# Patient Record
Sex: MALE | Race: WHITE
[De-identification: names, ages, dates, MRNs, and addresses within clinical notes are randomized per-mention and may not be internally consistent; named-entity substitution may affect disease eponyms.]

---

## 2018-01-08 ENCOUNTER — HOSPITAL ENCOUNTER (INPATIENT)
Dept: HOSPITAL 74 - YASAS | Age: 43
LOS: 14 days | Discharge: HOME | DRG: 772 | End: 2018-01-22
Attending: PSYCHIATRY & NEUROLOGY | Admitting: PSYCHIATRY & NEUROLOGY
Payer: COMMERCIAL

## 2018-01-08 VITALS — BODY MASS INDEX: 22.9 KG/M2

## 2018-01-08 DIAGNOSIS — F17.210: ICD-10-CM

## 2018-01-08 DIAGNOSIS — F11.20: Primary | ICD-10-CM

## 2018-01-08 DIAGNOSIS — F14.20: ICD-10-CM

## 2018-01-08 PROCEDURE — HZ42ZZZ GROUP COUNSELING FOR SUBSTANCE ABUSE TREATMENT, COGNITIVE-BEHAVIORAL: ICD-10-PCS | Performed by: PSYCHIATRY & NEUROLOGY

## 2018-01-08 RX ADMIN — NICOTINE SCH: 14 PATCH, EXTENDED RELEASE TRANSDERMAL at 21:29

## 2018-01-08 RX ADMIN — Medication SCH MG: at 21:29

## 2018-01-08 NOTE — HP
Admission [facility]


Chief Complaint: 





REHAB TX FOR DRUG DEPENDENCE


Allergies/Adverse Reactions: 


 Allergies











Allergy/AdvReac Type Severity Reaction Status Date / Time


 


No Known Allergies Allergy   Unverified 13 12:26











History of Present Illness: 





41 Y/O  MALE WITH A HX OF COCAINE AND HEROIN DEPENDENCE ON MMTP 

SEEKING REHAB TX. PT HAS PREVIOUS TX EPISODES.


Exam Limitations: No Limitations





- Ebola screening


Have you traveled outside of the country in the last 21 days: No


Have you had contact with anyone from an Ebola affected area: No


Have you been sick,other than usual withdrawal symptoms: No


Do you have a fever: No





- Review of Systems


Constitutional: Chills, Night Sweats


EENT: reports: Blurred Vision (WEARS GLASSES), Nose Congestion, Dental Problems 

(MISSING TEETH)


Respiratory: reports: No Symptoms reported


Cardiac: reports: No Symptoms Reported


GI: reports: No Symptoms Reported


: reports: No Symptoms Reported


Musculoskeletal: reports: No Symptoms Reported


Integumentary: reports: Bruising (HEALED IVD  INJ SITES ON BOTH ARMS)


Neuro: reports: No Symptoms reported


Endocrine: reports: No Symptoms Reported


Hematology: reports: No Symptoms Reported


Psychiatric: reports: Orientated x3, Anxious


Other Systems: Reviewed and Negative





Patient History





- Patient Medical History


Hx Anemia: No


Hx Asthma: No


Hx Chronic Obstructive Pulmonary Disease (COPD): No


Hx Cardiac Disorders: No


Hx Hypertension: No


Hx Hypercholesterolemia: No


HX Cerebrovascular Accident: No


Hx Seizures: No


Hx Diabetes: No


Hx Gastrointestinal Disorders: No


Hx Genitourinary Disorders: No


Hx Sexually Transmitted Disorders: No


Hx Thyroid Disease: No


Hx Human Immunodeficiency Virus (HIV): No (NEGATIVE HX)


Hx Hepatitis C: No


Hx Depression: No


Hx Suicide Attempt: No (DENIES)


Hx Bipolar Disorder: No


Hx Schizophrenia: No





- Patient Surgical History


Past Surgical History: No


Hx Neurologic Surgery: No


Hx Cataract Extraction: No


Hx Cardiac Surgery: No


Hx Lung Surgery: No


Hx Breast Surgery: No


Hx Breast Biopsy: No


Hx Abdominal Surgery: No


Hx Appendectomy: No


Hx Cholecystectomy: No


Hx Genitourinary Surgery: No


Hx Orthopedic Surgery: No


Anesthesia Reaction: No





- PPD History


Previous Implant?: Yes


Documented Results: Negative w/o proof


PPD to be Administered?: Yes





- Reproductive History


Patient is a Female of Child Bearing Age (11 -55 yrs old): No (MALE)





- Smoking Cessation


Smoking history: Current every day smoker


Have you smoked in the past 12 months: Yes


Aproximately how many cigarettes per day: 10


Hx Chewing Tobacco Use: No


Initiated information on smoking cessation: Yes


'Breaking Loose' booklet given: 18





- Substance & Tx. History


Hx Substance Use: Yes (COCAINE/HEROIN)


Substance Use Type: Cocaine, Heroin


Hx Substance Use Treatment: Yes (CURRENTLY IN [facility])





- Substances Abused


  ** Cocaine


Route: Inhalation


Frequency: 1-3 times last 30 days


Amount used: $20- 40


Age of first use: 26


Date of Last Use: 17





  ** Heroin


Route: Injection (AND SNIFF)


Frequency: 1-2 times per week


Amount used: 2-5 BAGS


Age of first use: 26


Date of Last Use: 19





Family Disease History





- Family Disease History


Family Disease History: CA: Grandparent (), Father (), Mother (

BREAST-ALIVE)





Admission Physical Exam [facility]





- Vital Signs


Vital Signs: 


 Vital Signs - 24 hr











  18





  12:19


 


Temperature 98.2 F


 


Pulse Rate 72


 


Respiratory 18





Rate 


 


Blood Pressure 128/75














- Physical


General Appearance: Yes: No Apparent Distress, Irritable, Anxious


HEENTM: Yes: EOMI, Normocephalic, JENNY, Pharynx Normal, Nasal Congestion, 

Rhinorrhea


Respiratory: Yes: Chest Non-Tender, Lungs Clear, Normal Breath Sounds, No 

Respiratory Distress


Neck: Yes: No masses,lesions,Nodules, Supple, Trachea in good position


Breast: Yes: Breast Exam Deferred


Cardiology: Yes: Regular Rhythm, Regular Rate, S1, S2


Abdominal: Yes: Normal Bowel Sounds, Non Tender, Flat


Genitourinary: Yes: Other (N/C)


Back: Yes: Within Normal Limits


Musculoskeletal: Yes: full range of Motion, Gait Steady


Extremities: Yes: Normal Range of Motion, Non-Tender


Neurological: Yes: CNs II-XII NML intact, Fully Oriented, Alert, Motor Strength 

5/5


Integumentary: Yes: Dry, Warm, Track Marks (OLD HEALED IVD INJ SITES BOTH 

FOREARMS)


Lymphatic: Yes: Within Normal Limits





- Diagnostic


(1) Cocaine dependence, uncomplicated


Current Visit: Yes   Status: Acute   





(2) Methadone maintenance therapy patient


Current Visit: Yes   Status: Chronic   





Cleared for Admission [facility]





- Detox or Rehab


Claeared for Rehab Admission: Yes





[facility] Breath Alcohol Content


Breath Alcohol Content: 0





Urine Drug Screen





- Results


Drug Screen Negative: No


Urine Drug Screen Results: SANAM-Cocaine, OPI-Opiates, MTD-Methadone





Inpatient Rehab Admission





- Initial Determination


Are CD services needed?: Yes


Free of communicable disease: Yes


Not in need of hospitalization: Yes





- Rehab Admission Criteria


Patient is meeting Inpatient Rehab admission criteria:: Yes

## 2018-01-09 LAB
ALBUMIN SERPL-MCNC: 3.6 G/DL (ref 3.4–5)
ALP SERPL-CCNC: 75 U/L (ref 45–117)
ALT SERPL-CCNC: 27 U/L (ref 12–78)
ANION GAP SERPL CALC-SCNC: 6 MMOL/L (ref 8–16)
APPEARANCE UR: (no result)
AST SERPL-CCNC: 19 U/L (ref 15–37)
BILIRUB SERPL-MCNC: 0.6 MG/DL (ref 0.2–1)
BILIRUB UR STRIP.AUTO-MCNC: NEGATIVE MG/DL
BUN SERPL-MCNC: 13 MG/DL (ref 7–18)
CALCIUM SERPL-MCNC: 9.4 MG/DL (ref 8.5–10.1)
CHLORIDE SERPL-SCNC: 105 MMOL/L (ref 98–107)
CO2 SERPL-SCNC: 30 MMOL/L (ref 21–32)
COLOR UR: YELLOW
CREAT SERPL-MCNC: 1 MG/DL (ref 0.7–1.3)
DEPRECATED RDW RBC AUTO: 13.8 % (ref 11.9–15.9)
GLUCOSE SERPL-MCNC: 59 MG/DL (ref 74–106)
HCT VFR BLD CALC: 40.3 % (ref 35.4–49)
HGB BLD-MCNC: 13.2 GM/DL (ref 11.7–16.9)
KETONES UR QL STRIP: NEGATIVE
LEUKOCYTE ESTERASE UR QL STRIP.AUTO: NEGATIVE
MCH RBC QN AUTO: 28.2 PG (ref 25.7–33.7)
MCHC RBC AUTO-ENTMCNC: 32.7 G/DL (ref 32–35.9)
MCV RBC: 86.1 FL (ref 80–96)
MUCOUS THREADS URNS QL MICRO: (no result)
NITRITE UR QL STRIP: NEGATIVE
PH UR: 5 [PH] (ref 5–8)
PLATELET # BLD AUTO: 180 K/MM3 (ref 134–434)
PMV BLD: 7.8 FL (ref 7.5–11.1)
POTASSIUM SERPLBLD-SCNC: 4.7 MMOL/L (ref 3.5–5.1)
PROT SERPL-MCNC: 8.1 G/DL (ref 6.4–8.2)
PROT UR QL STRIP: NEGATIVE
PROT UR QL STRIP: NEGATIVE
RBC # BLD AUTO: 4.68 M/MM3 (ref 4–5.6)
RBC # UR STRIP: (no result) /UL
SODIUM SERPL-SCNC: 141 MMOL/L (ref 136–145)
SP GR UR: 1.03 (ref 1–1.03)
UROBILINOGEN UR STRIP-MCNC: NEGATIVE MG/DL (ref 0.2–1)
WBC # BLD AUTO: 4.4 K/MM3 (ref 4–10)

## 2018-01-09 RX ADMIN — METHADONE HYDROCHLORIDE SCH MG: 40 TABLET ORAL at 09:47

## 2018-01-09 RX ADMIN — Medication SCH MG: at 21:38

## 2018-01-09 RX ADMIN — NICOTINE SCH: 14 PATCH, EXTENDED RELEASE TRANSDERMAL at 09:49

## 2018-01-09 RX ADMIN — Medication SCH TAB: at 09:48

## 2018-01-09 NOTE — HP
Psychiatrist Admission





- Data


Date of interview: 01/09/18


Admission source: [facility]


Identifying data: This is the second  inpatient rehabilitation admission for 

this 42 year old   male father of one, unemployed and domiciled

, residing with his family in [place].


Medical History: Patient reports a good physical health, on MMTP 70 mg daily, 

smokes cigarettes 5 a day.


Psychiatric History: Patient denies.


Physical/Sexual Abuse/Trauma History: Patient denies.


Vital Signs: 


 Vital Signs - 24 hr











  01/08/18 01/08/18 01/09/18





  12:19 17:45 00:30


 


Temperature 98.2 F 97.9 F 


 


Pulse Rate 72 60 


 


Respiratory 18 18 18





Rate   


 


Blood Pressure 128/75 127/78 














  01/09/18 01/09/18





  03:30 06:44


 


Temperature  98.1 F


 


Pulse Rate  55 L


 


Respiratory 18 18





Rate  


 


Blood Pressure  107/72











Allergies/Adverse Reactions: 


 Allergies











Allergy/AdvReac Type Severity Reaction Status Date / Time


 


No Known Allergies Allergy   Unverified 01/08/18 16:29











Date of last physical exam: 01/08/18


Concur with the findings of this exam: Yes





- Substance Abuse/Tx History


Hx Alcohol Use: No


Substance Use Type: Cocaine ($20-40 2-3 times a week.), Heroin (2-3 bags 2 

times a week)


Hx Substance Use Treatment: Yes ([facility],  2009)





Mental Status Exam





- Mental Status Exam


Alert and Oriented to: Time, Place, Person


Cognitive Function: Good


Patient Appearance: Well Groomed


Mood: Hopeful


Affect: Appropriate, Mood Congruent


Patient Behavior: Appropriate, Cooperative


Speech Pattern: Clear, Appropriate


Voice Loudness: Normal


Thought Process: Intact, Goal Oriented


Thought Disorder: Not Present


Hallucinations: Denies


Suicidal Ideation: Denies


Homicidal Ideation: Denies


Insight/Judgement: Fair


Sleep: Fair


Appetite: Fair


Muscle strength/Tone: Normal


Gait/Station: Normal





Psychiatric Findings





- Problem List (Axis 1, 2,3)


(1) Opioid dependence


Current Visit: Yes   Status: Acute   





(2) Cocaine dependence


Current Visit: Yes   Status: Acute   





(3) Nicotine dependence


Current Visit: Yes   Status: Acute   





(4) Methadone maintenance therapy patient


Current Visit: Yes   Status: Chronic   





- Initial Treatment Plan


Initial Treatment Plan: will monitor progress as needed.

## 2018-01-09 NOTE — EKG
Test Reason : 

Blood Pressure : ***/*** mmHG

Vent. Rate : 041 BPM     Atrial Rate : 041 BPM

   P-R Int : 168 ms          QRS Dur : 100 ms

    QT Int : 488 ms       P-R-T Axes : 068 054 067 degrees

   QTc Int : 402 ms

 

MARKED SINUS BRADYCARDIA

ABNORMAL ECG

NO PREVIOUS ECGS AVAILABLE

Confirmed by Thomas Smith MD ([id number]) on 1/9/2018 3:18:40 PM

 

Referred By:             Confirmed By:Thomas Smith MD

## 2018-01-10 RX ADMIN — Medication SCH TAB: at 09:50

## 2018-01-10 RX ADMIN — NICOTINE SCH: 14 PATCH, EXTENDED RELEASE TRANSDERMAL at 09:50

## 2018-01-10 RX ADMIN — METHADONE HYDROCHLORIDE SCH MG: 40 TABLET ORAL at 06:22

## 2018-01-10 RX ADMIN — Medication SCH MG: at 21:23

## 2018-01-11 RX ADMIN — METHADONE HYDROCHLORIDE SCH MG: 40 TABLET ORAL at 06:22

## 2018-01-11 RX ADMIN — NICOTINE SCH: 14 PATCH, EXTENDED RELEASE TRANSDERMAL at 10:13

## 2018-01-11 RX ADMIN — Medication SCH MG: at 21:14

## 2018-01-11 RX ADMIN — Medication SCH: at 10:13

## 2018-01-11 RX ADMIN — IBUPROFEN PRN MG: 400 TABLET, FILM COATED ORAL at 20:21

## 2018-01-12 RX ADMIN — Medication SCH TAB: at 10:30

## 2018-01-12 RX ADMIN — IBUPROFEN PRN MG: 400 TABLET, FILM COATED ORAL at 06:13

## 2018-01-12 RX ADMIN — METHADONE HYDROCHLORIDE SCH MG: 40 TABLET ORAL at 06:11

## 2018-01-12 RX ADMIN — IBUPROFEN PRN MG: 400 TABLET, FILM COATED ORAL at 21:36

## 2018-01-12 RX ADMIN — Medication SCH MG: at 21:36

## 2018-01-12 RX ADMIN — NICOTINE SCH: 14 PATCH, EXTENDED RELEASE TRANSDERMAL at 10:30

## 2018-01-13 RX ADMIN — Medication SCH TAB: at 09:47

## 2018-01-13 RX ADMIN — NICOTINE SCH: 14 PATCH, EXTENDED RELEASE TRANSDERMAL at 09:47

## 2018-01-13 RX ADMIN — IBUPROFEN PRN MG: 400 TABLET, FILM COATED ORAL at 21:16

## 2018-01-13 RX ADMIN — METHADONE HYDROCHLORIDE SCH MG: 40 TABLET ORAL at 06:02

## 2018-01-13 RX ADMIN — Medication SCH MG: at 21:16

## 2018-01-14 RX ADMIN — Medication SCH MG: at 21:15

## 2018-01-14 RX ADMIN — Medication SCH TAB: at 09:34

## 2018-01-14 RX ADMIN — NICOTINE SCH: 14 PATCH, EXTENDED RELEASE TRANSDERMAL at 09:35

## 2018-01-14 RX ADMIN — METHADONE HYDROCHLORIDE SCH MG: 40 TABLET ORAL at 06:31

## 2018-01-15 RX ADMIN — METHADONE HYDROCHLORIDE SCH MG: 40 TABLET ORAL at 06:31

## 2018-01-15 RX ADMIN — Medication SCH TAB: at 09:48

## 2018-01-15 RX ADMIN — Medication SCH MG: at 21:11

## 2018-01-15 RX ADMIN — NICOTINE SCH: 14 PATCH, EXTENDED RELEASE TRANSDERMAL at 09:48

## 2018-01-16 RX ADMIN — Medication SCH MG: at 21:20

## 2018-01-16 RX ADMIN — METHADONE HYDROCHLORIDE SCH MG: 40 TABLET ORAL at 06:25

## 2018-01-16 RX ADMIN — NICOTINE SCH: 14 PATCH, EXTENDED RELEASE TRANSDERMAL at 10:18

## 2018-01-16 RX ADMIN — Medication SCH TAB: at 10:18

## 2018-01-16 NOTE — PN
[facility] Progress Note (SOAP)


Subjective: 





requesting increase in dose becasue of cravings, insomnia and anxiety which he 

did nto have proiort o admission


Objective: 





01/16/18 09:52


 Vital Signs - 8 hr











  01/16/18 01/16/18





  03:30 06:57


 


Temperature  97.7 F


 


Pulse Rate  51 L


 


Respiratory 17 17





Rate  


 


Blood Pressure  118/59








 Laboratory Tests











  01/08/18 01/09/18 01/09/18





  06:30 10:30 10:30


 


WBC   4.4 


 


RBC   4.68 


 


Hgb   13.2 


 


Hct   40.3 


 


MCV   86.1 


 


MCH   28.2 


 


MCHC   32.7 


 


RDW   13.8 


 


Plt Count   180 


 


MPV   7.8 


 


Sodium    141


 


Potassium    4.7


 


Chloride    105


 


Carbon Dioxide    30


 


Anion Gap    6 L


 


BUN    13


 


Creatinine    1.0


 


Creat Clearance w eGFR    > 60


 


Random Glucose    59 L


 


Calcium    9.4


 


Total Bilirubin    0.6


 


AST    19


 


ALT    27


 


Alkaline Phosphatase    75


 


Total Protein    8.1


 


Albumin    3.6


 


Urine Color  Yellow  


 


Urine Appearance  Cloudy  


 


Urine pH  5.0  


 


Ur Specific Gravity  1.028  


 


Urine Protein  Negative  


 


Urine Glucose (UA)  Negative  


 


Urine Ketones  Negative  


 


Urine Blood  1+ H  


 


Urine Nitrite  Negative  


 


Urine Bilirubin  Negative  


 


Urine Urobilinogen  Negative  


 


Ur Leukocyte Esterase  Negative  


 


Urine WBC (Auto)  None  


 


Urine RBC (Auto)  8  


 


Urine Mucus  Rare  


 


RPR Titer   














  01/09/18





  10:30


 


WBC 


 


RBC 


 


Hgb 


 


Hct 


 


MCV 


 


MCH 


 


MCHC 


 


RDW 


 


Plt Count 


 


MPV 


 


Sodium 


 


Potassium 


 


Chloride 


 


Carbon Dioxide 


 


Anion Gap 


 


BUN 


 


Creatinine 


 


Creat Clearance w eGFR 


 


Random Glucose 


 


Calcium 


 


Total Bilirubin 


 


AST 


 


ALT 


 


Alkaline Phosphatase 


 


Total Protein 


 


Albumin 


 


Urine Color 


 


Urine Appearance 


 


Urine pH 


 


Ur Specific Gravity 


 


Urine Protein 


 


Urine Glucose (UA) 


 


Urine Ketones 


 


Urine Blood 


 


Urine Nitrite 


 


Urine Bilirubin 


 


Urine Urobilinogen 


 


Ur Leukocyte Esterase 


 


Urine WBC (Auto) 


 


Urine RBC (Auto) 


 


Urine Mucus 


 


RPR Titer  Nonreactive











Assessment: 





01/16/18 09:52


inadequate methadone dose for opioid use disorder - will increase dose to 8mg 

daily while in rehab, can go back to 70mg when he returns to the program

## 2018-01-17 RX ADMIN — NICOTINE SCH: 14 PATCH, EXTENDED RELEASE TRANSDERMAL at 10:03

## 2018-01-17 RX ADMIN — METHADONE HYDROCHLORIDE SCH MG: 40 TABLET ORAL at 06:18

## 2018-01-17 RX ADMIN — Medication SCH MG: at 21:20

## 2018-01-17 RX ADMIN — Medication SCH TAB: at 10:03

## 2018-01-18 RX ADMIN — Medication SCH TAB: at 09:49

## 2018-01-18 RX ADMIN — NICOTINE SCH: 14 PATCH, EXTENDED RELEASE TRANSDERMAL at 09:49

## 2018-01-18 RX ADMIN — Medication SCH MG: at 21:18

## 2018-01-18 RX ADMIN — METHADONE HYDROCHLORIDE SCH MG: 40 TABLET ORAL at 06:34

## 2018-01-19 RX ADMIN — NICOTINE SCH: 14 PATCH, EXTENDED RELEASE TRANSDERMAL at 09:47

## 2018-01-19 RX ADMIN — Medication SCH TAB: at 09:47

## 2018-01-19 RX ADMIN — Medication SCH MG: at 21:18

## 2018-01-19 RX ADMIN — METHADONE HYDROCHLORIDE SCH MG: 40 TABLET ORAL at 06:30

## 2018-01-20 RX ADMIN — Medication SCH MG: at 21:08

## 2018-01-20 RX ADMIN — METHADONE HYDROCHLORIDE SCH MG: 40 TABLET ORAL at 06:40

## 2018-01-20 RX ADMIN — Medication SCH TAB: at 09:54

## 2018-01-20 RX ADMIN — NICOTINE SCH: 14 PATCH, EXTENDED RELEASE TRANSDERMAL at 09:54

## 2018-01-21 RX ADMIN — Medication SCH TAB: at 09:42

## 2018-01-21 RX ADMIN — METHADONE HYDROCHLORIDE SCH MG: 40 TABLET ORAL at 06:17

## 2018-01-21 RX ADMIN — Medication SCH MG: at 21:10

## 2018-01-21 RX ADMIN — NICOTINE SCH: 14 PATCH, EXTENDED RELEASE TRANSDERMAL at 09:42

## 2018-01-22 VITALS — TEMPERATURE: 97.1 F | DIASTOLIC BLOOD PRESSURE: 62 MMHG | HEART RATE: 45 BPM | SYSTOLIC BLOOD PRESSURE: 121 MMHG

## 2018-01-22 RX ADMIN — NICOTINE SCH: 14 PATCH, EXTENDED RELEASE TRANSDERMAL at 10:06

## 2018-01-22 RX ADMIN — METHADONE HYDROCHLORIDE SCH MG: 40 TABLET ORAL at 06:30

## 2018-01-22 RX ADMIN — Medication SCH TAB: at 10:06

## 2018-01-22 NOTE — PN
Psychiatric Progress Note


Vital Signs: 


 Vital Signs











 Period  Temp  Pulse  Resp  BP Sys/Martinez  Pulse Ox


 


 Last 24 Hr  97.1 F  45  16-16  121/62  











Date of Session: 01/22/18


Chief Complaint:: Discharge visit


HPI: Patient addressed Opioid and Cocaine dependence .


ROS: Unremarkable.


Current Medications: 


Active Medications











Generic Name Dose Route Start Last Admin





  Trade Name Freq  PRN Reason Stop Dose Admin


 


Acetaminophen  650 mg  01/08/18 16:17  





  Tylenol -  PO   





  Q4H PRN   





  PAIN   


 


Al Hydroxide/Mg Hydroxide  30 ml  01/08/18 16:17  





  Mylanta Oral Suspension -  PO   





  Q6H PRN   





  DYSPEPSIA   


 


Eucalyptus/Menthol/Phenol/Sorbitol  1 each  01/08/18 16:17  





  Cepastat Lozenge -  MM   





  Q4H PRN   





  SORE THROAT   


 


Guaifenesin  10 ml  01/08/18 16:17  





  Robitussin Dm -  PO   





  Q6H PRN   





  COUGH   


 


Ibuprofen  400 mg  01/08/18 16:17  01/13/18 21:16





  Motrin -  PO   400 mg





  Q6H PRN   Administration





  SEVERE PAIN   


 


Loperamide HCl  4 mg  01/08/18 16:17  





  Imodium -  PO   





  Q6H PRN   





  DIARRHEA   


 


Magnesium Citrate  300 ml  01/08/18 16:17  





  Citroma -  PO   





  Q48H PRN   





  CONSTIPATION   


 


Magnesium Hydroxide  30 ml  01/08/18 16:17  





  Milk Of Magnesia -  PO   





  DAILY PRN   





  CONSTIPATION   


 


Methadone HCl  80 mg  01/17/18 06:00  01/22/18 06:30





  Dolophine -  PO   80 mg





  DAILY@0600 ANJU   Administration


 


Nicotine  14 mg  01/08/18 18:00  01/22/18 10:06





  Nicoderm Patch -  TD   Not Given





  DAILY ANJU   


 


Nicotine Polacrilex  2 mg  01/08/18 16:17  





  Nicorette Gum -  BC   





  Q2H PRN   





  NICOTINE REPLACEMENT RX   


 


Prenatal Multivit/Folic Acid/Iron  1 tab  01/09/18 10:00  01/22/18 10:06





  Prenatal Vitamins (Sjr) -  PO   1 tab





  DAILY ANJU   Administration


 


Pseudoephedrine/Triprolidine  1 combo  01/08/18 16:17  





  Actifed -  PO   





  TID PRN   





  NASAL CONGESTION   


 


Thiamine HCl  100 mg  01/08/18 22:00  01/21/18 21:10





  Vitamin B1 -  PO   100 mg





  HS ANJU   Administration











Current Side Effect: No


Lab tests ordered: No


Lab tests reviewed: Yes


Provider note:: Patient completed this program today.He has met his treatment 

goals and will continue to address hios issues on outpatient basis at [facility].He 

will continue Methadone 80 mg po daily.Patient identifies behaviors which 

contribute to relapse and skills ,supports he can utilze to maintian 

recovery.Patient focuses on insight gained in treatment including importance of 

changing attitude and ways he plans to maintain recovery.  Supportive therapy 

provided.  Patient is stable for discharge today.


Total face to face time:: 25





Mental Status Exam





- Mental Status Exam


Alert and Oriented to: Time, Place, Person


Cognitive Function: Grossly Intact


Patient Appearance: Well Groomed


Mood: Hopeful, Euthymic


Affect: Appropriate, Mood Congruent


Patient Behavior: Cooperative


Speech Pattern: Clear


Voice Loudness: Normal


Thought Process: Goal Oriented


Thought Disorder: Not Present


Hallucinations: Denies


Suicidal Ideation: Denies


Homicidal Ideation: Denies


Insight/Judgement: Fair


Sleep: Fair


Appetite: Fair


Muscle strength/Tone: Normal


Gait/Station: Normal